# Patient Record
Sex: MALE | Race: WHITE | NOT HISPANIC OR LATINO | Employment: FULL TIME | URBAN - METROPOLITAN AREA
[De-identification: names, ages, dates, MRNs, and addresses within clinical notes are randomized per-mention and may not be internally consistent; named-entity substitution may affect disease eponyms.]

---

## 2020-10-13 ENCOUNTER — OFFICE VISIT (OUTPATIENT)
Dept: FAMILY MEDICINE | Facility: CLINIC | Age: 60
End: 2020-10-13
Payer: COMMERCIAL

## 2020-10-13 VITALS
HEART RATE: 78 BPM | OXYGEN SATURATION: 97 % | SYSTOLIC BLOOD PRESSURE: 137 MMHG | DIASTOLIC BLOOD PRESSURE: 84 MMHG | WEIGHT: 235 LBS | HEIGHT: 75 IN | BODY MASS INDEX: 29.22 KG/M2

## 2020-10-13 DIAGNOSIS — Z12.5 SCREENING FOR PROSTATE CANCER: ICD-10-CM

## 2020-10-13 DIAGNOSIS — Z12.11 SCREEN FOR COLON CANCER: ICD-10-CM

## 2020-10-13 DIAGNOSIS — Z23 NEED FOR VACCINATION: ICD-10-CM

## 2020-10-13 DIAGNOSIS — Z11.59 NEED FOR HEPATITIS C SCREENING TEST: ICD-10-CM

## 2020-10-13 DIAGNOSIS — Z11.4 SCREENING FOR HIV (HUMAN IMMUNODEFICIENCY VIRUS): ICD-10-CM

## 2020-10-13 DIAGNOSIS — Z13.220 SCREENING FOR HYPERLIPIDEMIA: ICD-10-CM

## 2020-10-13 DIAGNOSIS — Z13.1 SCREENING FOR DIABETES MELLITUS: ICD-10-CM

## 2020-10-13 DIAGNOSIS — Z00.00 ROUTINE GENERAL MEDICAL EXAMINATION AT A HEALTH CARE FACILITY: Primary | ICD-10-CM

## 2020-10-13 DIAGNOSIS — R42 VERTIGO: ICD-10-CM

## 2020-10-13 PROBLEM — E66.3 OVERWEIGHT: Status: ACTIVE | Noted: 2020-10-13

## 2020-10-13 PROCEDURE — 87389 HIV-1 AG W/HIV-1&-2 AB AG IA: CPT | Performed by: FAMILY MEDICINE

## 2020-10-13 PROCEDURE — 90750 HZV VACC RECOMBINANT IM: CPT | Performed by: FAMILY MEDICINE

## 2020-10-13 PROCEDURE — 90682 RIV4 VACC RECOMBINANT DNA IM: CPT | Performed by: FAMILY MEDICINE

## 2020-10-13 PROCEDURE — 90471 IMMUNIZATION ADMIN: CPT | Performed by: FAMILY MEDICINE

## 2020-10-13 PROCEDURE — 99213 OFFICE O/P EST LOW 20 MIN: CPT | Mod: 25 | Performed by: FAMILY MEDICINE

## 2020-10-13 PROCEDURE — 82947 ASSAY GLUCOSE BLOOD QUANT: CPT | Performed by: FAMILY MEDICINE

## 2020-10-13 PROCEDURE — 90472 IMMUNIZATION ADMIN EACH ADD: CPT | Performed by: FAMILY MEDICINE

## 2020-10-13 PROCEDURE — 36415 COLL VENOUS BLD VENIPUNCTURE: CPT | Performed by: FAMILY MEDICINE

## 2020-10-13 PROCEDURE — 86803 HEPATITIS C AB TEST: CPT | Performed by: FAMILY MEDICINE

## 2020-10-13 PROCEDURE — G0103 PSA SCREENING: HCPCS | Performed by: FAMILY MEDICINE

## 2020-10-13 PROCEDURE — 80061 LIPID PANEL: CPT | Performed by: FAMILY MEDICINE

## 2020-10-13 PROCEDURE — 99386 PREV VISIT NEW AGE 40-64: CPT | Mod: 25 | Performed by: FAMILY MEDICINE

## 2020-10-13 RX ORDER — MECLIZINE HYDROCHLORIDE 25 MG/1
25 TABLET ORAL 3 TIMES DAILY PRN
Qty: 60 TABLET | Refills: 0 | Status: SHIPPED | OUTPATIENT
Start: 2020-10-13 | End: 2024-09-17

## 2020-10-13 SDOH — ECONOMIC STABILITY: TRANSPORTATION INSECURITY
IN THE PAST 12 MONTHS, HAS LACK OF TRANSPORTATION KEPT YOU FROM MEETINGS, WORK, OR FROM GETTING THINGS NEEDED FOR DAILY LIVING?: NOT ASKED

## 2020-10-13 SDOH — HEALTH STABILITY: MENTAL HEALTH: HOW OFTEN DO YOU HAVE 6 OR MORE DRINKS ON ONE OCCASION?: NOT ASKED

## 2020-10-13 SDOH — HEALTH STABILITY: MENTAL HEALTH: HOW OFTEN DO YOU HAVE A DRINK CONTAINING ALCOHOL?: NOT ASKED

## 2020-10-13 SDOH — ECONOMIC STABILITY: FOOD INSECURITY: WITHIN THE PAST 12 MONTHS, YOU WORRIED THAT YOUR FOOD WOULD RUN OUT BEFORE YOU GOT MONEY TO BUY MORE.: NOT ASKED

## 2020-10-13 SDOH — ECONOMIC STABILITY: TRANSPORTATION INSECURITY
IN THE PAST 12 MONTHS, HAS THE LACK OF TRANSPORTATION KEPT YOU FROM MEDICAL APPOINTMENTS OR FROM GETTING MEDICATIONS?: NOT ASKED

## 2020-10-13 SDOH — ECONOMIC STABILITY: INCOME INSECURITY: HOW HARD IS IT FOR YOU TO PAY FOR THE VERY BASICS LIKE FOOD, HOUSING, MEDICAL CARE, AND HEATING?: NOT ASKED

## 2020-10-13 SDOH — HEALTH STABILITY: MENTAL HEALTH: HOW MANY STANDARD DRINKS CONTAINING ALCOHOL DO YOU HAVE ON A TYPICAL DAY?: NOT ASKED

## 2020-10-13 SDOH — ECONOMIC STABILITY: FOOD INSECURITY: WITHIN THE PAST 12 MONTHS, THE FOOD YOU BOUGHT JUST DIDN'T LAST AND YOU DIDN'T HAVE MONEY TO GET MORE.: NOT ASKED

## 2020-10-13 ASSESSMENT — MIFFLIN-ST. JEOR: SCORE: 1962.61

## 2020-10-13 ASSESSMENT — PAIN SCALES - GENERAL: PAINLEVEL: NO PAIN (0)

## 2020-10-13 NOTE — PATIENT INSTRUCTIONS
Preventive Health Recommendations  Male Ages 50 - 64    Yearly exam:             See your health care provider every year in order to  o   Review health changes.   o   Discuss preventive care.    o   Review your medicines if your doctor has prescribed any.     Have a cholesterol test every 5 years, or more frequently if you are at risk for high cholesterol/heart disease.     Have a diabetes test (fasting glucose) every three years. If you are at risk for diabetes, you should have this test more often.     Have a colonoscopy at age 50, or have a yearly FIT test (stool test). These exams will check for colon cancer.      Talk with your health care provider about whether or not a prostate cancer screening test (PSA) is right for you.    You should be tested each year for STDs (sexually transmitted diseases), if you re at risk.     Shots: Get a flu shot each year. Get a tetanus shot every 10 years.     Nutrition:    Eat at least 5 servings of fruits and vegetables daily.     Eat whole-grain bread, whole-wheat pasta and brown rice instead of white grains and rice.     Get adequate Calcium and Vitamin D.     Lifestyle    Aerobic exercise for at least 150 minutes per week (40+ minutes per day, 4-6 days per week). This will help you control your weight and prevent disease. .     Limit alcohol to one drink per day.     No smoking.     Wear sunscreen to prevent skin cancer.     See your dentist every six months for an exam and cleaning.     See your eye doctor every 1 to 2 years.      You should receive your second (final) shingles vaccine between 12/13/20 & 4/13/21.         Patient Education     Dizziness (Vertigo) and Balance Problems: Staying Safe     Replace burned-out light bulbs to keep your home safe and well lit.     Falls or accidents can lead to pain, broken bones, and fear of future falls. Protect yourself and others by preparing for episodes. Simple steps can help you stay safe at home and wherever you  go.  Lighting  Keep all areas well lit. This helps your eyes send the right signals to the brain. It also makes you less likely to trip and fall. If bright lights make symptoms worse, dim the lights or lie in a dark room until the dizziness passes. Then turn the lights back to their normal level.  Tips:    Keep a flashlight by the bed.    Place nightlights in bathrooms and hallways.    Replace burned-out bulbs, or have someone replace them for you.  Preventing falls  To reduce your risk of falling:    Get out of bed or up from a chair slowly.    Wear low-heeled shoes that fit properly and have slip-resistant soles.    Remove throw rugs. Clear clutter from walkways.    Use handrails on stairs. Have handrails installed or adjusted if needed.    Install grab bars in the bathroom. Don't use towel racks for balance.    Use a shower stool. Also put adhesive strips in the shower or on the tub floor.  Going out  With a little time and preparation, you can get around safely.  Tips:    Bring a cane or walking aid if needed.    Give yourself plenty of time in case you start to get dizzy.    Ask your healthcare provider what type of exercise is safe for your condition.    Be patient. If an activity such as walking through a crowded shop causes you stress, you may not be ready for it yet.  Driving  If you become dizzy or disoriented while driving, you could hurt yourself and others. That's why it's best to not drive until symptoms have gone away. In some cases, your license may be temporarily held until it's safe for you to drive again.  For safety:    Ask a friend to drive for you.    Take public transportation.    Walk to stores and other places when you can.  Asking for help  Don't be afraid to ask for help running errands, cooking meals, and doing exercise. Whether it's a friend, loved one, neighbor, or stranger on the street, a little help can make a world of difference.   Date Last Reviewed: 11/1/2016 2000-2019 [employer]. [address] [place] [id number]. All rights reserved. This information is not intended as a substitute for professional medical care. Always follow your healthcare professional's instructions.           Patient Education     Managing Dizziness (Vertigo) with Medicines    Although medicines can't cure your problem, they can help control symptoms. Your doctor may prescribe medicines for a few weeks and then taper them off. Always take your medicine as prescribed. Never share your medicine with others.  Contact your healthcare provider right away if you have side effects from your medicines.   How medicines can help    Treat infection or inflammation. If you have an infection caused by bacteria, your doctor can prescribe antibiotics.    Limit conflicting balance signals. These medicines are often in pill form.    Ease nausea. Suppositories, pills, or shots can reduce vomiting.    Reduce pressure in the canals. Diuretics can be used to treat Meniere's disease. These medicines help your body get rid of extra fluid.    Ease other symptoms. Other medicines can help ease depression and anxiety caused by living with dizziness or fainting.  Date Last Reviewed: 11/1/2016 2000-2019 [employer]. [address] [place] [id number]. All rights reserved. This information is not intended as a substitute for professional medical care. Always follow your healthcare professional's instructions.

## 2020-10-13 NOTE — Clinical Note
Please abstract the following data from this visit with this patient into the appropriate field in Epic:      Other Tests found in the patient's chart through Chart Review/Care Everywhere:    Colonoscopy done by this group [facility] on this date: 8/12/11

## 2020-10-13 NOTE — PROGRESS NOTES
3  SUBJECTIVE:   CC: Kentrell Mercer is an 60 year old male who presents for preventive health visit.       Patient has been advised of split billing requirements and indicates understanding: Yes  Healthy Habits:    Do you get at least three servings of calcium containing foods daily (dairy, green leafy vegetables, etc.)? yes    Amount of exercise or daily activities, outside of work: none    Problems taking medications regularly not applicable    Medication side effects: No    Have you had an eye exam in the past two years? yes    Do you see a dentist twice per year? yes    Do you have sleep apnea, excessive snoring or daytime drowsiness?no    Dizziness      Duration: 2011-2    Description   Feeling faint:  no   Feeling like the surroundings are moving: YES  Loss of consciousness or falls: no     Intensity:  variable    Accompanying signs and symptoms:   Nausea/vomitting: YES- nausea  Palpitations: no   Weakness in arms or legs: no   Vision or speech changes: no   Ringing in ears (Tinnitus): no   Hearing loss related to dizziness: no   Other (fevers/chills/sweating/dyspnea): no     History (similar episodes/head trauma/previous evaluation/recent bleeding): None    Precipitating or alleviating factors (new meds/chemicals): getting up quickly (e.g. while lying supine at work, getting to standing quickly will trigger it)  Worse with activity/head movement: YES    Therapies tried and outcome: None      Today's PHQ-2 Score:   PHQ-2 ( 1999 Pfizer) 10/13/2020   Q1: Little interest or pleasure in doing things 0   Q2: Feeling down, depressed or hopeless 0   PHQ-2 Score 0       Abuse: Current or Past(Physical, Sexual or Emotional)- NO  Do you feel safe in your environment? YES    Have you ever done Advance Care Planning? (For example, a Health Directive, POLST, or a discussion with a medical provider or your loved ones about your wishes): No, advance care planning information given to patient to review.  Patient plans to  "discuss their wishes with loved ones or provider.      Social History     Tobacco Use     Smoking status: Never Smoker     Smokeless tobacco: Never Used   Substance Use Topics     Alcohol use: Not Currently     If you drink alcohol do you typically have >3 drinks per day or >7 drinks per week? No                      Past medical, family, and social histories, medications, and allergies are reviewed and updated in [facility].     ROS:  CONSTITUTIONAL: NEGATIVE for fever, chills, change in weight  INTEGUMENTARY/SKIN: NEGATIVE for worrisome rashes, moles or lesions  EYES: NEGATIVE for vision changes or irritation  ENT: NEGATIVE for ear, mouth and throat problems  RESP: NEGATIVE for significant cough or SOB  CV: NEGATIVE for chest pain, palpitations or peripheral edema  GI: NEGATIVE for nausea, abdominal pain, heartburn, or change in bowel habits   male: negative for dysuria, hematuria, decreased urinary stream, erectile dysfunction, urethral discharge  MUSCULOSKELETAL: NEGATIVE for significant arthralgias or myalgia  NEURO: NEGATIVE for weakness or paresthesias.  See above regarding dizziness.  PSYCHIATRIC: NEGATIVE for changes in mood or affect    OBJECTIVE:   /84 (BP Location: Left arm, Patient Position: Chair, Cuff Size: Adult Large)   Pulse 78   Ht 1.899 m (6' 2.75\")   Wt 106.6 kg (235 lb)   SpO2 97%   BMI 29.57 kg/m    EXAM:  GENERAL: healthy, alert and no distress  EYES: Eyes grossly normal to inspection, PERRL and conjunctivae and sclerae normal  HENT: ear canals and TM's normal, nose and mouth without ulcers or lesions  NECK: no adenopathy, no asymmetry, masses, or scars and thyroid normal to palpation  RESP: lungs clear to auscultation - no rales, rhonchi or wheezes  CV: regular rate and rhythm, normal S1 S2, no S3 or S4, no murmur, click or rub, no peripheral edema and peripheral pulses strong  ABDOMEN: soft, nontender, no hepatosplenomegaly, no masses and bowel sounds normal   (male): normal male " genitalia without lesions or urethral discharge, no hernia  MS: no gross musculoskeletal defects noted, no edema  SKIN: no suspicious lesions or rashes  NEURO: Normal strength and tone, sensory exam grossly normal, mentation intact, oriented times 3, cranial nerves 2-12 intact, DTR's normal and symmetric, gait normal and Romberg normal  PSYCH: mentation appears normal, affect normal/bright      ASSESSMENT/PLAN:   (Z00.00) Routine general medical examination at a health care facility  (primary encounter diagnosis)  Comment: Negative screening exam  Plan: Hepatitis C Screen Reflex to HCV RNA Quant and         Genotype, HIV Antigen Antibody Combo, Lipid         panel reflex to direct LDL Non-fasting, C RIV4         (FLUBLOK) VACCINE RECOMBINANT DNA PRSRV ANTIBIO        FREE, IM [05113], GLUCOSE, SHINGRIX [75004],         PSA, screen,  TDAP VACCINE (ADACEL)         [76071.002]        Follow-up in 1 year    (R42) Vertigo  Comment: Recurrent  Plan: meclizine (ANTIVERT) 25 MG tablet, PHYSICAL         THERAPY REFERRAL          (Z13.220) Screening for hyperlipidemia  Comment:   Plan: Lipid panel reflex to direct LDL Non-fasting          (Z13.1) Screening for diabetes mellitus  Comment:   Plan: GLUCOSE          (Z11.59) Need for hepatitis C screening test  Comment: indications for screening discussed with the patient   Plan: Hepatitis C Screen Reflex to HCV RNA Quant and         Genotype          (Z11.4) Screening for HIV (human immunodeficiency virus)  Comment: indications for screening discussed with the patient   Plan: HIV Antigen Antibody Combo          (Z12.5) Screening for prostate cancer  Comment:   Plan: PSA, screen            (Z12.11) Screen for colon cancer  Comment:   Plan: GASTROENTEROLOGY ADULT REF PROCEDURE ONLY          (Z23) Need for vaccination  Comment: Shingrix No. 1  Plan: C RIV4 (FLUBLOK) VACCINE RECOMBINANT DNA PRSRV         ANTIBIO FREE, IM [51216], SHINGRIX [22695], 1st        Administration  [06168],  "Each additional admin.        (Right click and add QUANTITY)  [28403], TDAP         VACCINE, (ADACEL)         [87781.002]        Shingrix No. 2 in 2 to 6 months        COUNSELING:  Reviewed preventive health counseling, as reflected in patient instructions  Special attention given to:        Regular exercise       Immunizations    Vaccinated for: Influenza, TDAP and Zoster             Consider Hep C screening for patients born between 1945 and 1965       HIV screeninx in teen years, 1x in adult years, and at intervals if high risk       Colon cancer screening       Prostate cancer screening    Estimated body mass index is 29.57 kg/m  as calculated from the following:    Height as of this encounter: 1.899 m (6' 2.75\").    Weight as of this encounter: 106.6 kg (235 lb).    Weight management plan: Discussed healthy diet and exercise guidelines as summarized in the AVS.    He reports that he has never smoked. He has never used smokeless tobacco.      Counseling Resources:  ATP IV Guidelines  Pooled Cohorts Equation Calculator  FRAX Risk Assessment  ICSI Preventive Guidelines  Dietary Guidelines for Americans,   USDA's MyPlate  ASA Prophylaxis  Lung CA Screening    Noe Reddy MD  [facility]  "

## 2020-10-14 LAB
CHOLEST SERPL-MCNC: 151 MG/DL
GLUCOSE SERPL-MCNC: 80 MG/DL (ref 70–99)
HCV AB SERPL QL IA: NONREACTIVE
HDLC SERPL-MCNC: 39 MG/DL
HIV 1+2 AB+HIV1 P24 AG SERPL QL IA: NONREACTIVE
LDLC SERPL CALC-MCNC: 59 MG/DL
NONHDLC SERPL-MCNC: 112 MG/DL
PSA SERPL-ACNC: 0.67 UG/L (ref 0–4)
TRIGL SERPL-MCNC: 264 MG/DL

## 2020-10-14 NOTE — NURSING NOTE
Prior to immunization administration, verified patients identity using patient s name and date of birth. Please see Immunization Activity for additional information.     Screening Questionnaire for Adult Immunization    Are you sick today?   No   Do you have allergies to medications, food, a vaccine component or latex?   No   Have you ever had a serious reaction after receiving a vaccination?   No   Do you have a long-term health problem with heart, lung, kidney, or metabolic disease (e.g., diabetes), asthma, a blood disorder, no spleen, complement component deficiency, a cochlear implant, or a spinal fluid leak?  Are you on long-term aspirin therapy?   No   Do you have cancer, leukemia, HIV/AIDS, or any other immune system problem?   No   Do you have a parent, brother, or sister with an immune system problem?   No   In the past 3 months, have you taken medications that affect  your immune system, such as prednisone, other steroids, or anticancer drugs; drugs for the treatment of rheumatoid arthritis, Crohn s disease, or psoriasis; or have you had radiation treatments?   No   Have you had a seizure, or a brain or other nervous system problem?   No   During the past year, have you received a transfusion of blood or blood    products, or been given immune (gamma) globulin or antiviral drug?   No   For women: Are you pregnant or is there a chance you could become       pregnant during the next month?   No   Have you received any vaccinations in the past 4 weeks?   No     Immunization questionnaire answers were all negative.        Per orders of Dr. Reddy, injection of Tdap, Shingrix and flu shot given by Esteban Anguiano. Patient instructed to remain in clinic for 15 minutes afterwards, and to report any adverse reaction to me immediately.       Screening performed by Esteban Anguiano on 10/13/2020

## 2021-03-11 ENCOUNTER — E-VISIT (OUTPATIENT)
Dept: FAMILY MEDICINE | Facility: CLINIC | Age: 61
End: 2021-03-11
Payer: COMMERCIAL

## 2021-03-11 DIAGNOSIS — Z53.9 ERRONEOUS ENCOUNTER--DISREGARD: Primary | ICD-10-CM

## 2021-03-12 ENCOUNTER — TELEPHONE (OUTPATIENT)
Dept: FAMILY MEDICINE | Facility: CLINIC | Age: 61
End: 2021-03-12

## 2021-03-12 ENCOUNTER — VIRTUAL VISIT (OUTPATIENT)
Dept: FAMILY MEDICINE | Facility: CLINIC | Age: 61
End: 2021-03-12
Payer: COMMERCIAL

## 2021-03-12 DIAGNOSIS — R51.9 NONINTRACTABLE HEADACHE, UNSPECIFIED CHRONICITY PATTERN, UNSPECIFIED HEADACHE TYPE: ICD-10-CM

## 2021-03-12 DIAGNOSIS — R06.02 SOB (SHORTNESS OF BREATH): ICD-10-CM

## 2021-03-12 DIAGNOSIS — U07.1 CLINICAL DIAGNOSIS OF COVID-19: Primary | ICD-10-CM

## 2021-03-12 DIAGNOSIS — R19.7 DIARRHEA, UNSPECIFIED TYPE: ICD-10-CM

## 2021-03-12 DIAGNOSIS — R05.8 DRY COUGH: ICD-10-CM

## 2021-03-12 PROCEDURE — 99214 OFFICE O/P EST MOD 30 MIN: CPT | Mod: TEL | Performed by: FAMILY MEDICINE

## 2021-03-12 NOTE — PATIENT INSTRUCTIONS
"Please send the Covid test results through [employer] attachment  Call [facility] to schedule for lab and x-ray today  Take Tylenol 500 mg every 4-6 hours as needed for headache and body aches  Patient Education     Self-Care for Headaches  Most headaches aren't serious and can be relieved with self-care. But some headaches may be a sign of another health problem like eye trouble or high blood pressure. To find the best treatment, learn what kind of headaches you get. For tension headaches, self-care will usually help. To treat migraines, ask your healthcare provider for advice. It's also possible to get both tension and migraine headaches. Self-care involves relieving the pain and avoiding headache \"triggers\" if you can.     Ways to reduce pain and tension  Try these steps:    Apply a cold compress or ice pack to the pain site.    Drink fluids. If nausea makes it hard to drink, try sucking on ice.    Rest. Protect yourself from bright light and loud noises.    Calm your emotions by imagining a peaceful scene.    Massage tight neck, shoulder, and head muscles.    To relax muscles, soak in a hot bath or use a hot shower.  Use medicines  Aspirin or other over-the-counter (OTC) pain medicines such as ibuprofen and acetaminophen can relieve headache. Remember: Never give aspirin to anyone 18 years old or younger because of the risk of getting Reye syndrome. Use pain medicines only when needed. Certain prescription and OTC medicines can lead to rebound headaches if they are taken too often. Check with your healthcare provider or pharmacist about your medicines.   Track your headaches  Keeping a headache diary can help you and your healthcare provider identify what's causing your headaches:     Note when each headache happens.    Identify your activities and the foods you've eaten 6 to 8 hours before the headache began.    Look for any trends or \"triggers.\"    Bring the information to your next medical " "appointment.  Signs of tension headache  Any of the following can be signs:     Dull pain or feeling of pressure in a tight band around your head    Pain in your neck or shoulders    Headache without a definite beginning or end    Headache after an activity such as driving or working on a computer  Signs of migraine  Any of the following can be signs:     Throbbing pain on one or both sides of your head    Nausea or vomiting    Extreme sensitivity to light, sound, and smells    Bright spots, flashes, or other visual changes    Pain or nausea so severe that you can't continue your daily activities  When to call your healthcare provider   Call your healthcare provider if any of these occur:     A headache that lingers after a recent injury or bump to the head    A headache that lasts for more than 3 days    Frequent headaches, especially in the morning  Get medical care right away if you have:    A headache along with slurred speech, changes in your vision, or numbness or weakness in your arms or legs    Headaches with seizures    A fever with a stiff neck or pain when you bend your head toward your chest    A headache that you would call \"the worst headache you have ever had\" or a severe, persistent headache that gets worse or doesn't get better with treatment  Wily last reviewed this educational content on 7/1/2020 2000-2020 [employer]. All rights reserved. This information is not intended as a substitute for professional medical care. Always follow your healthcare professional's instructions.           "

## 2021-03-12 NOTE — PROGRESS NOTES
Kentrell is a 60 year old who is being evaluated via a billable telephone visit.      What phone number would you like to be contacted at? See epic  How would you like to obtain your AVS? MyChart    Assessment & Plan     Clinical diagnosis of COVID-19  Current concerning symptoms of shortness of breath, recommended to get a chest x-ray and labs including CBC with differential, CMP and D-dimer  Patient will call the [facility] to schedule for these, he lives closer to the [facility].  Will f/u on results and call with recommendations.  Recommended patient to send the positive Covid test results that he had at [facility] through [employer] attachment  Patient verbalised understanding and is agreeable to the plan.    - CBC with platelets and differential; Future  - **Comprehensive metabolic panel FUTURE anytime; Future  - D dimer, quantitative; Future  - XR Chest 2 Views; Future    Nonintractable headache, unspecified chronicity pattern, unspecified headache type  Emphasized on good hydration  Can take Tylenol 500 mg every 4-6 hours as needed for headache      SOB (shortness of breath)  ddx-recent Covid illness/? PE given the recent Covid illness/?  Covid related pneumonia  Recommended chest x-ray and D-dimer today  - D dimer, quantitative; Future  - XR Chest 2 Views; Future    Dry cough  as above    - D dimer, quantitative; Future  - XR Chest 2 Views; Future    Diarrhea    1-2 loose stools per day  Avoid foods and juices ,, follow BRAT diet including eating white bread toast, rice, applesauce.  If diarrhea does not improve in 7 to 10 days, patient will follow up for further evaluation          31 minutes spent on the date of the encounter doing chart review, patient visit and documentation        Chart documentation done in part with Dragon Voice recognition Software. Although reviewed after completion, some word and grammatical error may remain.    See Patient Instructions    No  follow-ups on file.    Meri Burton MD  [facility] ADOLPH Pfeiffer is a 60 year old who presents for the following health issues   HPI     Patient is here for a telephone visit instead of in person visit due to the current COVID-19 pandemic.  Patient is new to the provider, is here today for the follow-up on his recent Covid illness.  Patient reported having symptoms of nausea, headache and diarrhea, nasal congestion, went to [facility] clinic on February 28 when he had positive Covid test.  He has been self quarantine for the past 2 weeks, he reported that his wife and children were tested negative  Patient continues to have a mild headache for which she has been taking Tylenol 500 mg with relief of pain.  He denies current concerns for nausea, vomiting but continues to have loose stools 1 to 2/day with no mix of blood or mucus.  Denies abdominal pain, UTI symptoms  Also has ongoing dry cough associated with shortness of breath with minimal exertion.  Denies chest pain, palpitations, dizziness but feels extremely tired and fatigued.  Does not have hemoptysis, previous history  of DVT, PE  Appetite is poor, has been eating once a day.  He works in commercial driving, is not sure if he needs a repeat test before he goes back to work.  Has not gotten any update from his employer.  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, commercial driving license        Review of Systems   CONSTITUTIONAL:malaise  INTEGUMENTARY/SKIN: NEGATIVE for worrisome rashes, moles or lesions  EYES: NEGATIVE for vision changes or irritation  ENT/MOUTH: NEGATIVE for ear, mouth and throat problems  RESP: Dry cough  CV: NEGATIVE for chest pain, palpitations or peripheral edema  GI: diarrhea  MUSCULOSKELETAL: NEGATIVE for significant arthralgias or myalgia  NEURO: NEGATIVE for weakness, dizziness or paresthesias  ENDOCRINE: NEGATIVE for temperature intolerance,  skin/hair changes  HEME/ALLERGY/IMMUNE: NEGATIVE for bleeding problems  PSYCHIATRIC: NEGATIVE for changes in mood or affect      Objective           Vitals:  No vitals were obtained today due to virtual visit.    Physical Exam   healthy, alert and no distress  PSYCH: Alert and oriented times 3; coherent speech, normal   rate and volume, able to articulate logical thoughts, able   to abstract reason, no tangential thoughts, no hallucinations   or delusions  His affect is normal  RESP: No cough, no audible wheezing, able to talk in full sentences  Remainder of exam unable to be completed due to telephone visits      Phone call duration: 17 minutes and 26 seconds from 3:42 PM through 4 PM through [employer] phone call

## 2021-03-15 ENCOUNTER — ANCILLARY PROCEDURE (OUTPATIENT)
Dept: GENERAL RADIOLOGY | Facility: CLINIC | Age: 61
End: 2021-03-15
Attending: FAMILY MEDICINE
Payer: COMMERCIAL

## 2021-03-15 DIAGNOSIS — R05.8 DRY COUGH: ICD-10-CM

## 2021-03-15 DIAGNOSIS — U07.1 CLINICAL DIAGNOSIS OF COVID-19: ICD-10-CM

## 2021-03-15 DIAGNOSIS — R06.02 SOB (SHORTNESS OF BREATH): ICD-10-CM

## 2021-03-15 PROCEDURE — 71046 X-RAY EXAM CHEST 2 VIEWS: CPT | Performed by: RADIOLOGY

## 2021-03-15 NOTE — RESULT ENCOUNTER NOTE
Ramses Pfeiffer,  Your chest x-ray is normal with no concern for pneumonia, this is good.  Have your labs checked out today as discussed.   Let me know if you have any questions. Take care.  Meir Burton MD

## 2021-03-16 NOTE — TELEPHONE ENCOUNTER
Provider E-Visit time total (minutes): 0   Patient address this issue with Dr. Burton on 3/12/2021.

## 2021-03-20 DIAGNOSIS — U07.1 CLINICAL DIAGNOSIS OF COVID-19: ICD-10-CM

## 2021-03-20 DIAGNOSIS — R06.02 SOB (SHORTNESS OF BREATH): ICD-10-CM

## 2021-03-20 DIAGNOSIS — R05.8 DRY COUGH: ICD-10-CM

## 2021-03-20 LAB
BASOPHILS # BLD AUTO: 0.1 10E9/L (ref 0–0.2)
BASOPHILS NFR BLD AUTO: 1 %
DIFFERENTIAL METHOD BLD: NORMAL
EOSINOPHIL # BLD AUTO: 0.4 10E9/L (ref 0–0.7)
EOSINOPHIL NFR BLD AUTO: 7.1 %
ERYTHROCYTE [DISTWIDTH] IN BLOOD BY AUTOMATED COUNT: 11.9 % (ref 10–15)
HCT VFR BLD AUTO: 45.6 % (ref 40–53)
HGB BLD-MCNC: 15.6 G/DL (ref 13.3–17.7)
LYMPHOCYTES # BLD AUTO: 2.1 10E9/L (ref 0.8–5.3)
LYMPHOCYTES NFR BLD AUTO: 41.1 %
MCH RBC QN AUTO: 29.7 PG (ref 26.5–33)
MCHC RBC AUTO-ENTMCNC: 34.2 G/DL (ref 31.5–36.5)
MCV RBC AUTO: 87 FL (ref 78–100)
MONOCYTES # BLD AUTO: 0.7 10E9/L (ref 0–1.3)
MONOCYTES NFR BLD AUTO: 12.6 %
NEUTROPHILS # BLD AUTO: 2 10E9/L (ref 1.6–8.3)
NEUTROPHILS NFR BLD AUTO: 38.2 %
PLATELET # BLD AUTO: 402 10E9/L (ref 150–450)
RBC # BLD AUTO: 5.25 10E12/L (ref 4.4–5.9)
WBC # BLD AUTO: 5.2 10E9/L (ref 4–11)

## 2021-03-20 PROCEDURE — 85379 FIBRIN DEGRADATION QUANT: CPT | Performed by: FAMILY MEDICINE

## 2021-03-20 PROCEDURE — 36415 COLL VENOUS BLD VENIPUNCTURE: CPT | Performed by: FAMILY MEDICINE

## 2021-03-20 PROCEDURE — 80053 COMPREHEN METABOLIC PANEL: CPT | Performed by: FAMILY MEDICINE

## 2021-03-20 PROCEDURE — 85025 COMPLETE CBC W/AUTO DIFF WBC: CPT | Performed by: FAMILY MEDICINE

## 2021-03-20 NOTE — LETTER
April 12, 2021      Kentrell Mercer  [address]  [place] [address]        Dear ,    We are writing to inform you of your test results.    Please make an appointment with your provider to review or follow up on your test results.  You can follow-up in the clinic or through a virtual visit.  Appointments can be made by calling [phone].    Resulted Orders   D dimer, quantitative   Result Value Ref Range    D Dimer 1.3 (H) 0.0 - 0.50 ug/ml FEU      Comment:      This D-dimer assay is intended for use in conjunction with a clinical pretest   probability assessment model to exclude pulmonary embolism (PE) and deep   venous thrombosis (DVT) in outpatients suspected of PE or DVT. The cut-off   value is 0.5 ug/mL FEU.     **Comprehensive metabolic panel FUTURE anytime   Result Value Ref Range    Sodium 139 133 - 144 mmol/L    Potassium 3.9 3.4 - 5.3 mmol/L    Chloride 105 94 - 109 mmol/L    Carbon Dioxide 29 20 - 32 mmol/L    Anion Gap 5 3 - 14 mmol/L    Glucose 87 70 - 99 mg/dL    Urea Nitrogen 14 7 - 30 mg/dL    Creatinine 1.05 0.66 - 1.25 mg/dL    GFR Estimate 77 >60 mL/min/[1.73_m2]      Comment:      Non  GFR Calc  Starting 12/18/2018, serum creatinine based estimated GFR (eGFR) will be   calculated using the Chronic Kidney Disease Epidemiology Collaboration   (CKD-EPI) equation.      GFR Estimate If Black 89 >60 mL/min/[1.73_m2]      Comment:       GFR Calc  Starting 12/18/2018, serum creatinine based estimated GFR (eGFR) will be   calculated using the Chronic Kidney Disease Epidemiology Collaboration   (CKD-EPI) equation.      Calcium 8.8 8.5 - 10.1 mg/dL    Bilirubin Total 0.7 0.2 - 1.3 mg/dL    Albumin 3.6 3.4 - 5.0 g/dL    Protein Total 7.7 6.8 - 8.8 g/dL    Alkaline Phosphatase 66 40 - 150 U/L    ALT 55 0 - 70 U/L    AST 26 0 - 45 U/L   CBC with platelets and differential   Result Value Ref Range    WBC 5.2 4.0 - 11.0 10e9/L    RBC Count 5.25 4.4 - 5.9 10e12/L     Hemoglobin 15.6 13.3 - 17.7 g/dL    Hematocrit 45.6 40.0 - 53.0 %    MCV 87 78 - 100 fl    MCH 29.7 26.5 - 33.0 pg    MCHC 34.2 31.5 - 36.5 g/dL    RDW 11.9 10.0 - 15.0 %    Platelet Count 402 150 - 450 10e9/L    % Neutrophils 38.2 %    % Lymphocytes 41.1 %    % Monocytes 12.6 %    % Eosinophils 7.1 %    % Basophils 1.0 %    Absolute Neutrophil 2.0 1.6 - 8.3 10e9/L    Absolute Lymphocytes 2.1 0.8 - 5.3 10e9/L    Absolute Monocytes 0.7 0.0 - 1.3 10e9/L    Absolute Eosinophils 0.4 0.0 - 0.7 10e9/L    Absolute Basophils 0.1 0.0 - 0.2 10e9/L    Diff Method Automated Method        If you have any questions or concerns, please call the clinic at the number listed above.       Sincerely,      Meir Burton MD

## 2021-03-20 NOTE — LETTER
April 12, 2021      Kentrell Mercer  [address]  [place] [address]        Dear ,    We are writing to inform you of your test results.    {results letter list:[id number]}    Resulted Orders   D dimer, quantitative   Result Value Ref Range    D Dimer 1.3 (H) 0.0 - 0.50 ug/ml FEU      Comment:      This D-dimer assay is intended for use in conjunction with a clinical pretest   probability assessment model to exclude pulmonary embolism (PE) and deep   venous thrombosis (DVT) in outpatients suspected of PE or DVT. The cut-off   value is 0.5 ug/mL FEU.     **Comprehensive metabolic panel FUTURE anytime   Result Value Ref Range    Sodium 139 133 - 144 mmol/L    Potassium 3.9 3.4 - 5.3 mmol/L    Chloride 105 94 - 109 mmol/L    Carbon Dioxide 29 20 - 32 mmol/L    Anion Gap 5 3 - 14 mmol/L    Glucose 87 70 - 99 mg/dL    Urea Nitrogen 14 7 - 30 mg/dL    Creatinine 1.05 0.66 - 1.25 mg/dL    GFR Estimate 77 >60 mL/min/[1.73_m2]      Comment:      Non  GFR Calc  Starting 12/18/2018, serum creatinine based estimated GFR (eGFR) will be   calculated using the Chronic Kidney Disease Epidemiology Collaboration   (CKD-EPI) equation.      GFR Estimate If Black 89 >60 mL/min/[1.73_m2]      Comment:       GFR Calc  Starting 12/18/2018, serum creatinine based estimated GFR (eGFR) will be   calculated using the Chronic Kidney Disease Epidemiology Collaboration   (CKD-EPI) equation.      Calcium 8.8 8.5 - 10.1 mg/dL    Bilirubin Total 0.7 0.2 - 1.3 mg/dL    Albumin 3.6 3.4 - 5.0 g/dL    Protein Total 7.7 6.8 - 8.8 g/dL    Alkaline Phosphatase 66 40 - 150 U/L    ALT 55 0 - 70 U/L    AST 26 0 - 45 U/L   CBC with platelets and differential   Result Value Ref Range    WBC 5.2 4.0 - 11.0 10e9/L    RBC Count 5.25 4.4 - 5.9 10e12/L    Hemoglobin 15.6 13.3 - 17.7 g/dL    Hematocrit 45.6 40.0 - 53.0 %    MCV 87 78 - 100 fl    MCH 29.7 26.5 - 33.0 pg    MCHC 34.2 31.5 - 36.5 g/dL    RDW 11.9 10.0 - 15.0 %     Platelet Count 402 150 - 450 10e9/L    % Neutrophils 38.2 %    % Lymphocytes 41.1 %    % Monocytes 12.6 %    % Eosinophils 7.1 %    % Basophils 1.0 %    Absolute Neutrophil 2.0 1.6 - 8.3 10e9/L    Absolute Lymphocytes 2.1 0.8 - 5.3 10e9/L    Absolute Monocytes 0.7 0.0 - 1.3 10e9/L    Absolute Eosinophils 0.4 0.0 - 0.7 10e9/L    Absolute Basophils 0.1 0.0 - 0.2 10e9/L    Diff Method Automated Method        If you have any questions or concerns, please call the clinic at the number listed above.       Sincerely,      Meir Burton MD

## 2021-03-22 ENCOUNTER — TELEPHONE (OUTPATIENT)
Dept: FAMILY MEDICINE | Facility: CLINIC | Age: 61
End: 2021-03-22

## 2021-03-22 LAB
ALBUMIN SERPL-MCNC: 3.6 G/DL (ref 3.4–5)
ALP SERPL-CCNC: 66 U/L (ref 40–150)
ALT SERPL W P-5'-P-CCNC: 55 U/L (ref 0–70)
ANION GAP SERPL CALCULATED.3IONS-SCNC: 5 MMOL/L (ref 3–14)
AST SERPL W P-5'-P-CCNC: 26 U/L (ref 0–45)
BILIRUB SERPL-MCNC: 0.7 MG/DL (ref 0.2–1.3)
BUN SERPL-MCNC: 14 MG/DL (ref 7–30)
CALCIUM SERPL-MCNC: 8.8 MG/DL (ref 8.5–10.1)
CHLORIDE SERPL-SCNC: 105 MMOL/L (ref 94–109)
CO2 SERPL-SCNC: 29 MMOL/L (ref 20–32)
CREAT SERPL-MCNC: 1.05 MG/DL (ref 0.66–1.25)
D DIMER PPP FEU-MCNC: 1.3 UG/ML FEU (ref 0–0.5)
GFR SERPL CREATININE-BSD FRML MDRD: 77 ML/MIN/{1.73_M2}
GLUCOSE SERPL-MCNC: 87 MG/DL (ref 70–99)
POTASSIUM SERPL-SCNC: 3.9 MMOL/L (ref 3.4–5.3)
PROT SERPL-MCNC: 7.7 G/DL (ref 6.8–8.8)
SODIUM SERPL-SCNC: 139 MMOL/L (ref 133–144)

## 2021-03-22 NOTE — TELEPHONE ENCOUNTER
Please call patient-he had a virtual visit with this provider few days ago for positive Covid testing and cough .  He was recommended to send us the positive test results through [employer] for provider to review.  I have not received any results yet  One of his lab results are slightly abnormal  In order for me to proceed with further evaluation, I need to see the positive test result first

## 2021-03-22 NOTE — RESULT ENCOUNTER NOTE
Awaiting Covid test results from patient as he was recommended to send them through [facility] at his virtual visit

## 2021-03-30 ENCOUNTER — ALLIED HEALTH/NURSE VISIT (OUTPATIENT)
Dept: NURSING | Facility: CLINIC | Age: 61
End: 2021-03-30
Payer: COMMERCIAL

## 2021-03-30 DIAGNOSIS — Z23 IMMUNIZATION DUE: Primary | ICD-10-CM

## 2021-03-30 PROCEDURE — 99207 PR NO CHARGE NURSE ONLY: CPT

## 2021-03-30 PROCEDURE — 90750 HZV VACC RECOMBINANT IM: CPT

## 2021-03-30 PROCEDURE — 90471 IMMUNIZATION ADMIN: CPT

## 2021-03-30 NOTE — RESULT ENCOUNTER NOTE
No response received regarding his positive Covid test results.  Please inform patient to follow-up if he continues to have symptoms

## 2021-03-30 NOTE — PROGRESS NOTES
Prior to immunization administration, verified patients identity using patient s name and date of birth. Please see Immunization Activity for additional information.     Screening Questionnaire for Adult Immunization    Are you sick today?   No   Do you have allergies to medications, food, a vaccine component or latex?   No   Have you ever had a serious reaction after receiving a vaccination?   No   Do you have a long-term health problem with heart, lung, kidney, or metabolic disease (e.g., diabetes), asthma, a blood disorder, no spleen, complement component deficiency, a cochlear implant, or a spinal fluid leak?  Are you on long-term aspirin therapy?   No   Do you have cancer, leukemia, HIV/AIDS, or any other immune system problem?   No   Do you have a parent, brother, or sister with an immune system problem?   No   In the past 3 months, have you taken medications that affect  your immune system, such as prednisone, other steroids, or anticancer drugs; drugs for the treatment of rheumatoid arthritis, Crohn s disease, or psoriasis; or have you had radiation treatments?   No   Have you had a seizure, or a brain or other nervous system problem?   No   During the past year, have you received a transfusion of blood or blood    products, or been given immune (gamma) globulin or antiviral drug?   No   For women: Are you pregnant or is there a chance you could become       pregnant during the next month?   No   Have you received any vaccinations in the past 4 weeks?   No     Immunization questionnaire answers were all negative.        Per orders of Dr. DAN, injection of SHINGRIX given by Christa Sandoval. Patient instructed to remain in clinic for 15 minutes afterwards, and to report any adverse reaction to me immediately.       Screening performed by Christa Sandoval on 3/30/2021 at 12:58 PM.

## 2021-04-12 NOTE — RESULT ENCOUNTER NOTE
I see that the [employer] message was not read by patient yet  Please mail these results in a  letter and recommend him to follow-up in the clinic or through your virtual visit

## 2021-09-08 ENCOUNTER — MYC MEDICAL ADVICE (OUTPATIENT)
Dept: FAMILY MEDICINE | Facility: CLINIC | Age: 61
End: 2021-09-08
Payer: COMMERCIAL

## 2021-10-06 ENCOUNTER — TELEPHONE (OUTPATIENT)
Dept: FAMILY MEDICINE | Facility: CLINIC | Age: 61
End: 2021-10-06

## 2021-10-06 NOTE — TELEPHONE ENCOUNTER
Patient Quality Outreach Summary      Summary:    Patient is due/failing the following:   Colonoscopy    Type of outreach:    Sent [employer] message.    Questions for provider review:    None                                                                                                                    HILL Pichardo.

## 2021-10-23 ENCOUNTER — HEALTH MAINTENANCE LETTER (OUTPATIENT)
Age: 61
End: 2021-10-23

## 2021-12-18 ENCOUNTER — HEALTH MAINTENANCE LETTER (OUTPATIENT)
Age: 61
End: 2021-12-18

## 2022-10-10 ENCOUNTER — HEALTH MAINTENANCE LETTER (OUTPATIENT)
Age: 62
End: 2022-10-10

## 2023-02-18 ENCOUNTER — HEALTH MAINTENANCE LETTER (OUTPATIENT)
Age: 63
End: 2023-02-18

## 2023-05-16 ENCOUNTER — OFFICE VISIT (OUTPATIENT)
Dept: URGENT CARE | Facility: URGENT CARE | Age: 63
End: 2023-05-16
Payer: COMMERCIAL

## 2023-05-16 VITALS
WEIGHT: 231.6 LBS | SYSTOLIC BLOOD PRESSURE: 144 MMHG | TEMPERATURE: 98.2 F | OXYGEN SATURATION: 95 % | BODY MASS INDEX: 29.14 KG/M2 | DIASTOLIC BLOOD PRESSURE: 81 MMHG | HEART RATE: 85 BPM

## 2023-05-16 DIAGNOSIS — H66.002 ACUTE SUPPURATIVE OTITIS MEDIA OF LEFT EAR WITHOUT SPONTANEOUS RUPTURE OF TYMPANIC MEMBRANE, RECURRENCE NOT SPECIFIED: Primary | ICD-10-CM

## 2023-05-16 DIAGNOSIS — J01.90 ACUTE SINUSITIS WITH SYMPTOMS > 10 DAYS: ICD-10-CM

## 2023-05-16 DIAGNOSIS — I10 ELEVATED BLOOD PRESSURE READING WITH DIAGNOSIS OF HYPERTENSION: ICD-10-CM

## 2023-05-16 PROCEDURE — 99214 OFFICE O/P EST MOD 30 MIN: CPT | Performed by: PHYSICIAN ASSISTANT

## 2023-05-16 RX ORDER — AMOXICILLIN 875 MG
875 TABLET ORAL 2 TIMES DAILY
Qty: 20 TABLET | Refills: 0 | Status: SHIPPED | OUTPATIENT
Start: 2023-05-16 | End: 2023-05-26

## 2023-05-16 NOTE — PROGRESS NOTES
Chief Complaint   Patient presents with     Otalgia     Pt noticed plugged left ear yesterday, feel like it is full of water                   ASSESSMENT:       ICD-10-CM    1. Acute suppurative otitis media of left ear without spontaneous rupture of tympanic membrane, recurrence not specified  H66.002 amoxicillin (AMOXIL) 875 MG tablet      2. Acute sinusitis with symptoms > 10 days  J01.90 amoxicillin (AMOXIL) 875 MG tablet      3. Elevated blood pressure reading with diagnosis of hypertension  I10             PLAN: Left otitis media, sinusitis.  Oral amoxicillin.  I have discussed clinical findings with patient.  Side effects of medications discussed.  Symptomatic care is discussed.  I have discussed the possibility of  worsening symptoms and indication to RTC or go to the ER if they occur.  All questions are answered, patient indicates understanding of these issues and is in agreement with plan.   Patient care instructions are discussed/given at the end of visit.   Lots of rest and fluids.  Mildly elevated blood pressure, recheck once feeling better outside of clinic.  Follow-up with primary if any concerns.    Vira Elizalde PA-C      SUBJECTIVE:    Right ear discomfort, pressure sensation for 1 to 2 days.  Nasal congestion for 2 weeks.  No fever.  History of hypertension, on meds.        No Known Allergies    Past Medical History:   Diagnosis Date     Heart murmur     mild, with exertion only, negative work-up       meclizine (ANTIVERT) 25 MG tablet, Take 1 tablet (25 mg) by mouth 3 times daily as needed for dizziness (Patient not taking: Reported on 5/16/2023)    No current facility-administered medications on file prior to visit.      Social History     Tobacco Use     Smoking status: Never     Smokeless tobacco: Never   Vaping Use     Vaping status: Not on file   Substance Use Topics     Alcohol use: Not Currently     Comment: rare       ROS:  CONSTITUTIONAL: Negative for fatigue or fever.  EYES:  Negative for eye problems.  ENT: As above.  RESP: Neg for SHORTNESS OF BREATH.  CV: Negative for chest pains.  GI: Negative for vomiting.  MUSCULOSKELETAL:  Negative for significant muscle or joint pains.  NEUROLOGIC: Negative for headaches.  SKIN: Negative for rash.  PSYCH: Normal mentation for age.    OBJECTIVE:  BP (!) 144/81 (BP Location: Left arm, Patient Position: Sitting, Cuff Size: Adult Large)   Pulse 85   Temp 98.2  F (36.8  C) (Tympanic)   Wt 105.1 kg (231 lb 9.6 oz)   SpO2 95%   BMI 29.14 kg/m    GENERAL APPEARANCE: Healthy, alert and no distress.  EYES:Conjunctiva/sclera clear.  EARS: No cerumen.   Ear canals w/o erythema.  Right TM is translucent.  Left TM is red and bulging.     NECK: Supple, nontender, no lymphadenopathy.  RESP: Breathing comfortably   NEURO: Awake, alert    SKIN: No rashes        Vira Elizalde PA-C

## 2023-05-30 ENCOUNTER — TELEPHONE (OUTPATIENT)
Dept: FAMILY MEDICINE | Facility: CLINIC | Age: 63
End: 2023-05-30
Payer: COMMERCIAL

## 2023-05-30 NOTE — LETTER
May 30, 2023    Kentrell Mercer  [address]  [facility] [address]    Dear Kentrell,    At [facility] we care about your health and are committed to providing quality patient care.     Here is a list of Health Maintenance topics that are due now or due soon:  Health Maintenance Due   Topic Date Due    ANNUAL REVIEW OF HM ORDERS  Never done    COVID-19 Vaccine (3 - Pfizer series) 07/09/2021    COLORECTAL CANCER SCREENING  08/12/2021    YEARLY PREVENTIVE VISIT  10/13/2021    INFLUENZA VACCINE (1) 09/01/2022    PHQ-2 (once per calendar year)  01/01/2023        We are recommending that you:  Schedule a WELLNESS (Preventative/Physical) APPOINTMENT with your primary care provider. If you go elsewhere for your wellness appointments then please disregard this reminder    ,   Schedule a COLONOSCOPY to assess for colon cancer (due every 10 years or 5 years in higher risk situations.)       Colon cancer is now the second leading cause of cancer-related deaths in the [place] for both men and women and there are over 130,000 new cases and 50,000 deaths per year from colon cancer.  Colonoscopies can prevent 90-95% of these deaths.  Problem lesions can be removed before they ever become cancer.  This test is not only looking for cancer, but also getting rid of precancerious lesions.    If you are under/uninsured, we recommend you contact the [facility] program. [employer] is a free colorectal cancer screening program that provides colonoscopies for eligible under/uninsured [place] men and women. If you are interested in receiving a free colonoscopy, please call [facility] at [phone] (mention code [employer]) to see if you re eligible.     If you do not wish to do a colonoscopy or cannot afford to do one, at this time, there is another option. It is called a FIT test or Fecal Immunochemical Occult Blood Test (take home stool sample kit).  It does not replace the colonoscopy for  colorectal cancer screening, but it can detect hidden bleeding in the lower colon.  It does need to be repeated every year and if a positive result is obtained, you would be referred for a colonoscopy.    If you have completed either one of these tests at another facility, please call/respond to this message with the details of when and where the tests were done and if they were normal or not. Or have the records sent to our clinic so that we can best coordinate your care., and   Schedule a Nurse-Only appointment to update your immunizations: Your records indicate that you are not up to date with your immunizations, please schedule a nurse-only appointment to get these updated or update them at your next office visit. If this is incorrect, please disregard.    To schedule an appointment or discuss this further, you may contact us by phone at the [facility] at [phone] or online through the patient [email or web address] @ [email or web address]    Thank you for trusting [facility] and we appreciate the opportunity to serve you.  We look forward to supporting your healthcare needs in the future.    Your partners in health,      Quality Committee at [facility]

## 2023-05-30 NOTE — TELEPHONE ENCOUNTER
Patient Quality Outreach    Patient is due for the following:   Colon Cancer Screening  Physical Preventive Adult Physical      Topic Date Due     COVID-19 Vaccine (3 - Pfizer series) 07/09/2021     Flu Vaccine (1) 09/01/2022       Next Steps:   Schedule a Adult Preventative    Type of outreach:    Sent [employer] message. and Sent letter.      Questions for provider review:    None           Héctor Weinberg MA

## 2024-03-16 ENCOUNTER — HEALTH MAINTENANCE LETTER (OUTPATIENT)
Age: 64
End: 2024-03-16

## 2024-09-17 ENCOUNTER — OFFICE VISIT (OUTPATIENT)
Dept: FAMILY MEDICINE | Facility: CLINIC | Age: 64
End: 2024-09-17
Payer: COMMERCIAL

## 2024-09-17 VITALS
BODY MASS INDEX: 29.19 KG/M2 | TEMPERATURE: 98.5 F | DIASTOLIC BLOOD PRESSURE: 90 MMHG | HEIGHT: 75 IN | WEIGHT: 234.8 LBS | SYSTOLIC BLOOD PRESSURE: 146 MMHG | HEART RATE: 72 BPM | OXYGEN SATURATION: 96 % | RESPIRATION RATE: 16 BRPM

## 2024-09-17 DIAGNOSIS — B07.0 PLANTAR WART: Primary | ICD-10-CM

## 2024-09-17 DIAGNOSIS — Z23 NEED FOR VACCINATION: ICD-10-CM

## 2024-09-17 DIAGNOSIS — Z12.11 SCREEN FOR COLON CANCER: ICD-10-CM

## 2024-09-17 PROCEDURE — 90673 RIV3 VACCINE NO PRESERV IM: CPT | Performed by: FAMILY MEDICINE

## 2024-09-17 PROCEDURE — 90471 IMMUNIZATION ADMIN: CPT | Performed by: FAMILY MEDICINE

## 2024-09-17 PROCEDURE — 17110 DESTRUCTION B9 LES UP TO 14: CPT | Performed by: FAMILY MEDICINE

## 2024-09-17 ASSESSMENT — PAIN SCALES - GENERAL: PAINLEVEL: NO PAIN (0)

## 2024-09-17 NOTE — PATIENT INSTRUCTIONS
At [facility], we strive to deliver an exceptional experience to you, every time we see you. If you receive a survey, please let us know what we are doing well and/or what we could improve upon, as we do value your feedback.  If you have MyChart, you can expect to receive results automatically within 24 hours of their completion.  Your provider will send a note interpreting your results as well.   If you do not have MyChart, you should receive your results in about a week by mail.    Your care team:                            Family Medicine Internal Medicine   MD Manuel Bajwa, MD Janee Mi, MD Papo Vazquez, MD Ana Cristina King, PADoritaC    [place], MD Pediatrics   Qing Abarca, MD Kristen Ricardo, MD Enma Marino, APRN CNP Kerry Wang APRN CNP   MD Sonya Manjarrez, MD Roxana Dennis, CNP     Rafael Loomis, CNP Same-Day Provider (No follow-up visits)   ARIELA Aiken, DNP Radha Curry, ARIELA Alonzo, FNP, BC JOSE RAUL StaffordC     Clinic hours: Monday - Thursday 7 am-6 pm; Fridays 7 am-5 pm.   Urgent care: Monday - Friday 10 am- 8 pm; Saturday and Sunday 9 am-5 pm.    Clinic: [phone]       [facility]: Monday - Thursday 8 am - 7 pm; Friday 8 am - 6 pm  [facility]: [phone]

## 2024-09-17 NOTE — PROGRESS NOTES
"  Assessment & Plan     (B07.0) Plantar wart  (primary encounter diagnosis)  Comment: 1  Plan: DESTRUCT BENIGN LESION, UP TO 14        The wart is treated with cryotherapy using liquid nitrogen x 12 seconds x 3 applications. The patient tolerated the procedure well. After 3 days, he can resume any OTC treatment he finds helpful. Follow up in 3+ weeks for retreatment of any residual as desired.     (Z12.11) Screen for colon cancer  Comment:   Plan: Colonoscopy Screening  Referral            (Z23) Need for vaccination  Comment:   Plan: INFLUENZA VACCINE TRIVALENT(FLUBLOK)                    Az Pfeiffer is a 64 year old, presenting for the following health issues:  Wart        9/17/2024     3:51 PM   Additional Questions   Roomed by lavonne   Accompanied by self         9/17/2024     3:51 PM   Patient Reported Additional Medications   Patient reports taking the following new medications none     History of Present Illness       Reason for visit:  Wart on side of right foot  Symptom onset:  3-4 weeks ago  Symptom intensity:  Mild  Symptom progression:  Staying the same  Had these symptoms before:  No   He is taking medications regularly.     First noticed several weeks ago when he noticed that it was getting tender when it rubs on the shoe. He has not treated it.    Warts  Onset/Duration: a month ago   Description (location/number): right side outside of the foot below the toes   Accompanying signs and symptoms (pain, redness): No  History: prior warts: YES  Therapies tried and outcome: none                Objective    BP (!) 146/90 (BP Location: Right arm, Patient Position: Sitting, Cuff Size: Adult Large)   Pulse 72   Temp 98.5  F (36.9  C) (Oral)   Resp 16   Ht 1.905 m (6' 3\")   Wt 106.5 kg (234 lb 12.8 oz)   SpO2 96%   BMI 29.35 kg/m    Body mass index is 29.35 kg/m .  Physical Exam   GENERAL: healthy, alert and no distress  EYES: Eyes grossly normal to inspection, PERRL, EOMI, sclerae white " and conjunctivae normal  MS: no gross musculoskeletal defects noted, no edema  SKIN: There is a plantar wart present on the RT foot lateral side of 5th MTP joint, approx 12 mm  NEURO: Normal strength and tone, sensory exam grossly normal, mentation intact, oriented times 3 and cranial nerves 2-12 intact  PSYCH: mentation appears normal, affect normal/bright             Signed Electronically by: Noe Reddy MD

## 2025-03-22 ENCOUNTER — HEALTH MAINTENANCE LETTER (OUTPATIENT)
Age: 65
End: 2025-03-22